# Patient Record
(demographics unavailable — no encounter records)

---

## 2024-10-09 NOTE — REASON FOR VISIT
[FreeTextEntry1] : Referred by her friend Kelly Espitia.  86 year-old female with left subclavian artery stenosis with subclavian steal syndrome, HTN, HLD, hypothyroidism, presents for followup.    Patient was last seen on 9/9/24 - Patient reports recent cold. She had echocardiogram done on 7/9/24 and nuclear stress test on 7/25/24 which were okay. I advised patient to start on ASA 81 mg.    Carotid Doppler 7/9/24 showed retrograde flow of the left vertebral artery.    Chest CTA at [facility] 8/3/24 showed complete occlusion of the proximal left subclavian artery.    Patient wore a 7-day event monitor 8/19/24 and it showed average HR of 60, rare APC's, rare PVC's, and 6 short runs of PAT (20 beats the longest). No pauses.

## 2024-10-09 NOTE — HISTORY OF PRESENT ILLNESS
[FreeTextEntry1] : 10/9/24 -   9/9/24 - Patient reports recent cold. She had echocardiogram done on 7/9/24 and nuclear stress test on 7/25/24 which were okay. I advised patient to start on ASA 81 mg.    8/19/24 - Pt reports 2 episodes of pre-syncope, one is last year, another one is on 6/12/24, each time lasting for a few minutes. Pt reports felt sudden SSCP and abdominal discomfort, lasting for a few seconds before the pre-syncope episode. Pt denies SOB or palpitation. Pt denies h/o syncope.  Pt had workup with Dr. NATE Jay.  Carotid Doppler showed retrograde flow of the left vertebral artery.  [place] [facility] [place] of the proximal left subclavian artery.  Pt drinks coffee 1 cup/day.  Pt is on Losartan 25mg, Lipitor 10mg, Levothyroxine 50mcg.  Today's /64 P 63.  Right brachial BP -120 left brachial BP -80.  Patient was not orthostatic (-150-140).  Exam unremarkable.  ECG showed no ischemic changes.  I advised patient to undergo an echocardiogram.  I advised patient to wear a 7-day event monitor.  I advised patient to consider intervention of the left subclavian artery stenosis.  Patient wore a 7-day event monitor and it showed average HR of 60, rare APC's, rare PVC's, and 6 short runs of PAT (20 beats the longest). No pauses.

## 2024-11-08 NOTE — REASON FOR VISIT
[FreeTextEntry1] : History of present illness 86-year-old female  Left subclavian artery stenosis with subclavian steal syndrome --Carotid Doppler on 7/9/2024 demonstrated retrograde flow of the left vertebral artery --Chest CTA at [facility] [place] on 8/3/2024 demonstrated complete occlusion of the proximal left subclavian artery --Complete occlusion of the proximal left subclavian artery with distal reconstitution likely via vascular collateralization.  This is not significantly changed compared to her prior study on 1/29/2021. Hypertension Hyperlipidemia  Hypothyroidism  She notes that she has suffered for year from discomfort in her right and left shoulder and left ankle.  Notes some mild pain in the third digit in her left hand.  Denies feeling light-headed or dizzy.  She walks long distance without any limitations. Notes walking 1000s of steps a day. No ataxia reported.  Does not use a cane or walker.  Notes mild reproducible discomfort in right and left anterior chest. The patient is right dominant. Notes no discomfort when carrying out activities in the left arm such as carrying objects.  Notes that the left side feels weak but that is chronic.  Notes sometimes has paresthesia in left arm.  Also notes increased abdominal gaseous distention.  Denies shortness of breath, lighted sensation, dizzy or palpitation.  No syncope reported.  She drinks 1 cup of coffee a day.    Shine/Avni (interpretory) [id number]  Assessment/plan Complete chronic obstruction of left subclavian artery Syncope ?etiology  --Discussed with patient findings on imaging studies.  She has had longstanding complete obstruction of the left subclavian artery.  It is not clear that the patient is having symptoms of vertebrobasilar insufficiency.  Does not report any changes in symptoms upon activity when she does exercise of the arm or externally does sharp turns of her head to the left. --She denies arm pain/fatigue/numbness or paresthesias.  Denies any recurrent dizziness, blurring of vision, syncope, vertigo, disequilibrium, ataxia, tinnitus or hearing loss.  It is noted that there is a difference of at least 50 mm between the affected and the normal arm.  Decrease radial pulse in the left compared to the right radial artery. --Due to the patient's mild nondisabling symptoms recommend continued medical management.  Continue aggressive medical management of blood pressure, dyslipidemia and other risk factors such as diabetes. --Indications and details for percutaneous intervention were reviewed.  Benefits and risks were discussed.  Risks include but are not limited to infection, bleeding, arrhythmia, TIA/stroke, hemodynamic instability, vascular injury, need for urgent surgery and death. --If the patient is to become symptomatic it is recommended that she undergo endovascular intervention.  She was instructed that due to the chronicity of the occlusion sometimes an endovascular approach cannot recanalize the vessel.  In such situations an oen surgical bypass may be considered.  Indications for endovascular intervention and the pros/cons and risks/benefits of the procedure were gone over.. -- EKG performed due to history of PFO.  All questions and concerns to the patient and family who accompanied her were addressed.

## 2024-11-08 NOTE — PHYSICAL EXAM
[Well Developed] : well developed [Well Nourished] : well nourished [No Acute Distress] : no acute distress [Normal Conjunctiva] : normal conjunctiva [Normal Venous Pressure] : normal venous pressure [No Carotid Bruit] : no carotid bruit [Normal S1, S2] : normal S1, S2 [No Murmur] : no murmur [No Rub] : no rub [No Gallop] : no gallop [Clear Lung Fields] : clear lung fields [Good Air Entry] : good air entry [No Respiratory Distress] : no respiratory distress  [Soft] : abdomen soft [Non Tender] : non-tender [No Masses/organomegaly] : no masses/organomegaly [Normal Bowel Sounds] : normal bowel sounds [Normal Gait] : normal gait [No Edema] : no edema [No Cyanosis] : no cyanosis [No Clubbing] : no clubbing [No Varicosities] : no varicosities [No Rash] : no rash [No Skin Lesions] : no skin lesions [No Focal Deficits] : no focal deficits [Moves all extremities] : moves all extremities [Normal Speech] : normal speech [Alert and Oriented] : alert and oriented [Normal memory] : normal memory [de-identified] : Decreased 1+ left radial pulse, right radial pulse 2+

## 2024-11-08 NOTE — REVIEW OF SYSTEMS
[Headache] : headache [Feeling Fatigued] : feeling fatigued [Joint Pain] : joint pain [Joint Stiffness] : joint stiffness [Muscle Cramps] : muscle cramps [Myalgia] : myalgia [Tingling (Paresthesia)] : tingling [Weakness] : weakness [Negative] : Heme/Lymph [FreeTextEntry7] : See HPI [de-identified] : See HPI, syncope

## 2024-11-08 NOTE — REVIEW OF SYSTEMS
[Headache] : headache [Feeling Fatigued] : feeling fatigued [Joint Pain] : joint pain [Joint Stiffness] : joint stiffness [Muscle Cramps] : muscle cramps [Myalgia] : myalgia [Tingling (Paresthesia)] : tingling [Weakness] : weakness [Negative] : Heme/Lymph [FreeTextEntry7] : See HPI [de-identified] : See HPI, syncope

## 2024-11-08 NOTE — PHYSICAL EXAM
[Well Developed] : well developed [Well Nourished] : well nourished [No Acute Distress] : no acute distress [Normal Conjunctiva] : normal conjunctiva [Normal Venous Pressure] : normal venous pressure [No Carotid Bruit] : no carotid bruit [Normal S1, S2] : normal S1, S2 [No Murmur] : no murmur [No Rub] : no rub [No Gallop] : no gallop [Clear Lung Fields] : clear lung fields [Good Air Entry] : good air entry [No Respiratory Distress] : no respiratory distress  [Soft] : abdomen soft [Non Tender] : non-tender [No Masses/organomegaly] : no masses/organomegaly [Normal Bowel Sounds] : normal bowel sounds [Normal Gait] : normal gait [No Edema] : no edema [No Cyanosis] : no cyanosis [No Clubbing] : no clubbing [No Varicosities] : no varicosities [No Rash] : no rash [No Skin Lesions] : no skin lesions [Moves all extremities] : moves all extremities [No Focal Deficits] : no focal deficits [Normal Speech] : normal speech [Alert and Oriented] : alert and oriented [Normal memory] : normal memory [de-identified] : Decreased 1+ left radial pulse, right radial pulse 2+

## 2024-11-08 NOTE — REASON FOR VISIT
[FreeTextEntry1] : History of present illness 86-year-old female  Left subclavian artery stenosis with subclavian steal syndrome --Carotid Doppler on 7/9/2024 demonstrated retrograde flow of the left vertebral artery --[place] [facility] [place] demonstrated complete occlusion of the proximal left subclavian artery --Complete occlusion of the proximal left subclavian artery with distal reconstitution likely via vascular collateralization.  This is not significantly changed compared to her prior study on 1/29/2021. Hypertension Hyperlipidemia  Hypothyroidism  She notes that she has suffered for year from discomfort in her right and left shoulder and left ankle.  Notes some mild pain in the third digit in her left hand.  Denies feeling light-headed or dizzy.  She walks long distance without any limitations. Notes walking 1000s of steps a day. No ataxia reported.  Does not use a cane or walker.  Notes mild reproducible discomfort in right and left anterior chest. The patient is right dominant. Notes no discomfort when carrying out activities in the left arm such as carrying objects.  Notes that the left side feels weak but that is chronic.  Notes sometimes has paresthesia in left arm.  Also notes increased abdominal gaseous distention.  Denies shortness of breath, lighted sensation, dizzy or palpitation.  No syncope reported.  She drinks 1 cup of coffee a day.    Shine/Avni (interpretory) [id number]  Assessment/plan Complete chronic obstruction of left subclavian artery Syncope ?etiology  --Discussed with patient findings on imaging studies.  She has had longstanding complete obstruction of the left subclavian artery.  It is not clear that the patient is having symptoms of vertebrobasilar insufficiency.  Does not report any changes in symptoms upon activity when she does exercise of the arm or externally does sharp turns of her head to the left. --She denies arm pain/fatigue/numbness or paresthesias.  Denies any recurrent dizziness, blurring of vision, syncope, vertigo, disequilibrium, ataxia, tinnitus or hearing loss.  It is noted that there is a difference of at least 50 mm between the affected and the normal arm.  Decrease radial pulse in the left compared to the right radial artery. --Due to the patient's mild nondisabling symptoms recommend continued medical management.  Continue aggressive medical management of blood pressure, dyslipidemia and other risk factors such as diabetes. --Indications and details for percutaneous intervention were reviewed.  Benefits and risks were discussed.  Risks include but are not limited to infection, bleeding, arrhythmia, TIA/stroke, hemodynamic instability, vascular injury, need for urgent surgery and death. --If the patient is to become symptomatic it is recommended that she undergo endovascular intervention.  She was instructed that due to the chronicity of the occlusion sometimes an endovascular approach cannot recanalize the vessel.  In such situations an oen surgical bypass may be considered.  Indications for endovascular intervention and the pros/cons and risks/benefits of the procedure were gone over.. -- EKG performed due to history of PFO.  All questions and concerns to the patient and family who accompanied her were addressed.

## 2025-02-27 NOTE — HISTORY OF PRESENT ILLNESS
[FreeTextEntry1] : 2/27/25 - Patient reports fatigue.  Patient reports pain in different body parts.  Patient denies CP, SOB, or palpitations.  Patient denies additional near syncopal episodes other than the 2 episodes last year.  Pt is on Losartan 25mg, Lipitor 10mg, Levothyroxine 50mcg. She is on ASA.  /68 HR 73.  Exam unremarkable.  I advised patient to continue current medications.  I advised patient to try CoQ 10 for myalgia.  FU 6 months.    10/9/24 - Please refer to NP note below. Pt wants to know more about the bypass procedure for left subclavian artery stenosis. Pt reports multiple body pain (including headache, right shoulder, legs). Pt also reports increased gases in abdomen and had vomited once in the morning. Patient denies CP, SOB, palpitations, or lightheadedness. Patient denies h/o syncope. Pt is on Losartan 25mg, Lipitor 10mg, Levothyroxine 50mcg. She is not on ASA.  Today's /66 P 80.  I advised patient to see Dr. David Solomon for possible subclavian artery stent.  Pt saw Dr. Solomon and pt likely not symptomatic enough to need stent yet.  9/9/24 - Patient reports recent cold. She had echocardiogram done on 7/9/24 and nuclear stress test on 7/25/24 which were okay. I advised patient to start on ASA 81 mg.    8/19/24 - Pt reports 2 episodes of pre-syncope, one is last year, another one is on 6/12/24, each time lasting for a few minutes. Pt reports felt sudden SSCP and abdominal discomfort, lasting for a few seconds before the pre-syncope episode. Pt denies SOB or palpitation. Pt denies h/o syncope.  Pt had workup with Dr. NATE Jay.  Carotid Doppler showed retrograde flow of the left vertebral artery.  Chest [place]A at [facility] [place] showed complete occlusion of the proximal left subclavian artery.  Pt drinks coffee 1 cup/day.  Pt is on Losartan 25mg, Lipitor 10mg, Levothyroxine 50mcg.  Today's /64 P 63.  Right brachial BP -120 left brachial BP -80.  Patient was not orthostatic (-150-140).  Exam unremarkable.  ECG showed no ischemic changes.  I advised patient to undergo an echocardiogram.  Echo with Dr. ALEX Jay on 7/9/24 showed normal LV function with mild MR and mild TR.  I advised patient to wear a 7-day event monitor.  I advised patient to consider intervention of the left subclavian artery stenosis.  Patient wore a 7-day event monitor and it showed average HR of 60, rare APC's, rare PVC's, and 6 short runs of PAT (20 beats the longest). No pauses.

## 2025-02-27 NOTE — REASON FOR VISIT
[FreeTextEntry1] : Referred by her friend Kelly Espitia.  87 year-old female with left subclavian artery stenosis with subclavian steal syndrome, HTN, HLD, hypothyroidism, presents for followup.    Patient was last seen on 10/9/24 -> Pt wants to know more about the bypass procedure for left subclavian artery stenosis. Pt reports multiple body pain (including headache, right shoulder, legs). Pt also reports increased gases in abdomen and had vomited once in the morning. Patient denies CP, SOB, palpitations, or lightheadedness. Patient denies h/o syncope. Pt is on Losartan 25mg, Lipitor 10mg, Levothyroxine 50mcg. She is not on ASA.  Today's /66 P 80.  I advised patient to see Dr. David Solomon for possible subclavian artery stent.  Pt saw Dr. Solomon and pt likely not symptomatic enough to need stent yet.  Carotid Doppler 7/9/24 showed retrograde flow of the left vertebral artery.    Chest CTA at [facility] 8/3/24 showed complete occlusion of the proximal left subclavian artery.    Patient wore a 7-day event monitor 8/19/24 and it showed average HR of 60, rare APC's, rare PVC's, and 6 short runs of PAT (20 beats the longest). No pauses.    Echo with Dr. ALEX Jay on 7/9/24 showed normal LV function with mild MR and mild TR.

## 2025-03-09 NOTE — PHYSICAL EXAM
[Well Developed] : well developed [Well Nourished] : well nourished [No Acute Distress] : no acute distress [Normal Conjunctiva] : normal conjunctiva [Normal Venous Pressure] : normal venous pressure [No Carotid Bruit] : no carotid bruit [Normal S1, S2] : normal S1, S2 [No Murmur] : no murmur [No Rub] : no rub [No Gallop] : no gallop [Clear Lung Fields] : clear lung fields [Good Air Entry] : good air entry [No Respiratory Distress] : no respiratory distress  [Soft] : abdomen soft [Non Tender] : non-tender [No Masses/organomegaly] : no masses/organomegaly [Normal Bowel Sounds] : normal bowel sounds [Normal Gait] : normal gait [No Edema] : no edema [No Cyanosis] : no cyanosis [No Clubbing] : no clubbing [No Varicosities] : no varicosities [No Rash] : no rash [No Skin Lesions] : no skin lesions [Moves all extremities] : moves all extremities [No Focal Deficits] : no focal deficits [Normal Speech] : normal speech [Alert and Oriented] : alert and oriented [Normal memory] : normal memory [de-identified] : Decreased 1+ left radial pulse, right radial pulse 2+

## 2025-03-09 NOTE — REVIEW OF SYSTEMS
[Feeling Fatigued] : feeling fatigued [Joint Pain] : joint pain [Joint Stiffness] : joint stiffness [Muscle Cramps] : muscle cramps [Myalgia] : myalgia [Weakness] : weakness [Negative] : Heme/Lymph [Headache] : no headache [Tingling (Paresthesia)] : no tingling [FreeTextEntry7] : See HPI [FreeTextEntry8] : See HPI [de-identified] : See HPI, syncope

## 2025-03-09 NOTE — REASON FOR VISIT
[FreeTextEntry1] : History of present illness 87-year-old female  Left subclavian artery stenosis with subclavian steal syndrome --Carotid Doppler on 7/9/2024 demonstrated retrograde flow of the left vertebral artery --[place] [facility] [place] demonstrated complete occlusion of the proximal left subclavian artery --Complete occlusion of the proximal left subclavian artery with distal reconstitution likely via vascular collateralization.  This is not significantly changed compared to her prior study on 1/29/2021. Hypertension Hyperlipidemia  Hypothyroidism  She notes that she has suffered for year from discomfort in her right and left shoulder and left ankle.  Notes some mild pain in the third digit in her left hand.  Denies feeling light-headed or dizzy.  She walks long distance without any limitations. Notes walking 1000s of steps a day. No ataxia reported.  Does not use a cane or walker.  Notes mild reproducible discomfort in right and left anterior chest. The patient is right dominant. Notes no discomfort when carrying out activities in the left arm such as carrying objects.  Notes that the left side feels weak but that is chronic.  Notes sometimes has paresthesia in left arm.  Also notes increased abdominal gaseous distention.  Denies shortness of breath, lighted sensation, dizzy or palpitation.  No syncope reported.  She drinks 1 cup of coffee a day.    Shine/Avni (interpretory) [id number]  ================================= 3/6/2025 Suffers from chronic fatigue and pain in different body parts (unchanged). Denies any cardiopulmonary complaints or palpitations. No recent presyncope or near syncopal episodes other than the 2 episodes last year. It was recommended she start CoQ 10 for myalgia.  Walks every day around [address] steps if weather is nice.  Denies any change in exercise tolerance. Notes that her urine "stinks" and feels that something is wrong with her kidney.  Her urine began to smell two months ago.  Her urine is normal in color.  No pain in her right arm.  No fevers, chills or sweats.  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  Seeing her internist tomorrow in [place] during which time blood work/urinalysis. Notes she suffers from urinary incontinence.  If abnormal renal insuffiency recommend patient be seen by a nephrologist. --Discussed with patient and her friend who accompanied her findings on imaging studies.  She has had longstanding complete obstruction of the left subclavian artery.  It is not clear that the patient is having symptoms of vertebrobasilar insufficiency.  Does not report any changes in symptoms upon activity when she does exercise of the arm or externally or does sharp turns of her head to the left. --She denies arm pain/fatigue/numbness or paresthesias.  Denies any recurrent dizziness, blurring of vision, syncope, vertigo, disequilibrium, ataxia, tinnitus or hearing loss.  It is noted that there is a difference of at least 50 mm between the affected and the normal arm.  Decrease radial pulse in the left compared to the right radial artery. --Due to the patient's mild nondisabling symptoms recommend continued medical management.  Continue aggressive medical management of blood pressure, dyslipidemia and other risk factors such as diabetes. --Indications and details for percutaneous intervention were reviewed.  Benefits and risks were discussed.  Risks include but are not limited to infection, bleeding, arrhythmia, TIA/stroke, hemodynamic instability, vascular injury, need for urgent surgery and death. --If the patient is to become symptomatic it is recommended that she undergo endovascular intervention.  She was instructed that due to the chronicity of the occlusion sometimes an endovascular approach cannot recanalize the vessel.  In such situations an open surgical bypass may be considered.  Indications for endovascular intervention and the pros/cons and risks/benefits of the procedure were gone over.. -- EKG performed due to history of PFO.  All questions and concerns to the patient and friend who accompanied her were addressed.

## 2025-03-09 NOTE — REVIEW OF SYSTEMS
[Feeling Fatigued] : feeling fatigued [Joint Pain] : joint pain [Joint Stiffness] : joint stiffness [Muscle Cramps] : muscle cramps [Myalgia] : myalgia [Weakness] : weakness [Negative] : Heme/Lymph [Headache] : no headache [Tingling (Paresthesia)] : no tingling [FreeTextEntry7] : See HPI [FreeTextEntry8] : See HPI [de-identified] : See HPI, syncope

## 2025-03-09 NOTE — REASON FOR VISIT
[FreeTextEntry1] : History of present illness 87-year-old female  Left subclavian artery stenosis with subclavian steal syndrome --Carotid Doppler on 7/9/2024 demonstrated retrograde flow of the left vertebral artery --[place] at [facility] [place]3/2024 demonstrated complete occlusion of the proximal left subclavian artery --Complete occlusion of the proximal left subclavian artery with distal reconstitution likely via vascular collateralization.  This is not significantly changed compared to her prior study on 1/29/2021. Hypertension Hyperlipidemia  Hypothyroidism  She notes that she has suffered for year from discomfort in her right and left shoulder and left ankle.  Notes some mild pain in the third digit in her left hand.  Denies feeling light-headed or dizzy.  She walks long distance without any limitations. Notes walking 1000s of steps a day. No ataxia reported.  Does not use a cane or walker.  Notes mild reproducible discomfort in right and left anterior chest. The patient is right dominant. Notes no discomfort when carrying out activities in the left arm such as carrying objects.  Notes that the left side feels weak but that is chronic.  Notes sometimes has paresthesia in left arm.  Also notes increased abdominal gaseous distention.  Denies shortness of breath, lighted sensation, dizzy or palpitation.  No syncope reported.  She drinks 1 cup of coffee a day.    Shine/Avni (interpretory) [id number]  ================================= 3/6/2025 Suffers from chronic fatigue and pain in different body parts (unchanged). Denies any cardiopulmonary complaints or palpitations. No recent presyncope or near syncopal episodes other than the 2 episodes last year. It was recommended she start CoQ 10 for myalgia.  Walks every day around 93310 steps if weather is nice.  Denies any change in exercise tolerance. Notes that her urine "stinks" and feels that something is wrong with her kidney.  Her urine began to smell two months ago.  Her urine is normal in color.  No pain in her right arm.  No fevers, chills or sweats.  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  Seeing her internist tomorrow in [place] during which time blood work/urinalysis. Notes she suffers from urinary incontinence.  If abnormal renal insuffiency recommend patient be seen by a nephrologist. --Discussed with patient and her friend who accompanied her findings on imaging studies.  She has had longstanding complete obstruction of the left subclavian artery.  It is not clear that the patient is having symptoms of vertebrobasilar insufficiency.  Does not report any changes in symptoms upon activity when she does exercise of the arm or externally or does sharp turns of her head to the left. --She denies arm pain/fatigue/numbness or paresthesias.  Denies any recurrent dizziness, blurring of vision, syncope, vertigo, disequilibrium, ataxia, tinnitus or hearing loss.  It is noted that there is a difference of at least 50 mm between the affected and the normal arm.  Decrease radial pulse in the left compared to the right radial artery. --Due to the patient's mild nondisabling symptoms recommend continued medical management.  Continue aggressive medical management of blood pressure, dyslipidemia and other risk factors such as diabetes. --Indications and details for percutaneous intervention were reviewed.  Benefits and risks were discussed.  Risks include but are not limited to infection, bleeding, arrhythmia, TIA/stroke, hemodynamic instability, vascular injury, need for urgent surgery and death. --If the patient is to become symptomatic it is recommended that she undergo endovascular intervention.  She was instructed that due to the chronicity of the occlusion sometimes an endovascular approach cannot recanalize the vessel.  In such situations an open surgical bypass may be considered.  Indications for endovascular intervention and the pros/cons and risks/benefits of the procedure were gone over.. -- EKG performed due to history of PFO.  All questions and concerns to the patient and friend who accompanied her were addressed.

## 2025-03-09 NOTE — PHYSICAL EXAM
[Well Developed] : well developed [Well Nourished] : well nourished [No Acute Distress] : no acute distress [Normal Conjunctiva] : normal conjunctiva [Normal Venous Pressure] : normal venous pressure [No Carotid Bruit] : no carotid bruit [Normal S1, S2] : normal S1, S2 [No Murmur] : no murmur [No Rub] : no rub [No Gallop] : no gallop [Clear Lung Fields] : clear lung fields [Good Air Entry] : good air entry [No Respiratory Distress] : no respiratory distress  [Soft] : abdomen soft [Non Tender] : non-tender [No Masses/organomegaly] : no masses/organomegaly [Normal Bowel Sounds] : normal bowel sounds [Normal Gait] : normal gait [No Edema] : no edema [No Cyanosis] : no cyanosis [No Clubbing] : no clubbing [No Varicosities] : no varicosities [No Rash] : no rash [No Skin Lesions] : no skin lesions [Moves all extremities] : moves all extremities [No Focal Deficits] : no focal deficits [Normal Speech] : normal speech [Alert and Oriented] : alert and oriented [Normal memory] : normal memory [de-identified] : Decreased 1+ left radial pulse, right radial pulse 2+

## 2025-05-23 NOTE — HISTORY OF PRESENT ILLNESS
[FreeTextEntry1] : 5/19/25 - Patient is planning on going on a trip but is concerned and anxious about ear pain and leg pain on the plane. Patient denies CP. Patient denies SOB. Patient denies palpitations. Patient denies lightheadedness. Patient is on Losartan 25 mg.   (1) Left subclavian artery stenosis with subclavian steal syndrome - Patient has been stable.   (2) HTN - Her BP was elevated.  I advised patient to increase Losartan 50 mg.  I advised patient to take Amlodipine 2.5 mg PRN SBP>150.  (3) Insomnia, muscle pain - I advised patient to try MgOxide qhs.  (3) followup - 3 months.  2/27/25 - Patient reports fatigue.  Patient reports pain in different body parts.  Patient denies CP, SOB, or palpitations.  Patient denies additional near syncopal episodes other than the 2 episodes last year.  Pt is on Losartan 25mg, Lipitor 10mg, Levothyroxine 50mcg. She is on ASA.  /68 HR 73.  Exam unremarkable.  I advised patient to continue current medications.  I advised patient to try CoQ 10 for myalgia.  FU 6 months.    10/9/24 - Please refer to NP note below. Pt wants to know more about the bypass procedure for left subclavian artery stenosis. Pt reports multiple body pain (including headache, right shoulder, legs). Pt also reports increased gases in abdomen and had vomited once in the morning. Patient denies CP, SOB, palpitations, or lightheadedness. Patient denies h/o syncope. Pt is on Losartan 25mg, Lipitor 10mg, Levothyroxine 50mcg. She is not on ASA.  Today's /66 P 80.  I advised patient to see Dr. David Solomon for possible subclavian artery stent.  Pt saw Dr. Solomon and pt likely not symptomatic enough to need stent yet.  9/9/24 - Patient reports recent cold. She had echocardiogram done on 7/9/24 and nuclear stress test on 7/25/24 which were okay. I advised patient to start on ASA 81 mg.    8/19/24 - Pt reports 2 episodes of pre-syncope, one is last year, another one is on 6/12/24, each time lasting for a few minutes. Pt reports felt sudden SSCP and abdominal discomfort, lasting for a few seconds before the pre-syncope episode. Pt denies SOB or palpitation. Pt denies h/o syncope.  Pt had workup with Dr. NATE Jay.  Carotid Doppler showed retrograde flow of the left vertebral artery.  Chest CTA at [facility] [place] showed complete occlusion of the proximal left subclavian artery.  Pt drinks coffee 1 cup/day.  Pt is on Losartan 25mg, Lipitor 10mg, Levothyroxine 50mcg.  Today's /64 P 63.  Right brachial BP -120 left brachial BP -80.  Patient was not orthostatic (-150-140).  Exam unremarkable.  ECG showed no ischemic changes.  I advised patient to undergo an echocardiogram.  Echo with Dr. ALEX Jay on 7/9/24 showed normal LV function with mild MR and mild TR.  I advised patient to wear a 7-day event monitor.  I advised patient to consider intervention of the left subclavian artery stenosis.  Patient wore a 7-day event monitor and it showed average HR of 60, rare APC's, rare PVC's, and 6 short runs of PAT (20 beats the longest). No pauses.

## 2025-05-23 NOTE — REASON FOR VISIT
[FreeTextEntry1] : Referred by her friend Kelly Espitia.  87 year-old female with left subclavian artery stenosis with subclavian steal syndrome, HTN, HLD, hypothyroidism, presents for followup.    Patient was last seen on 2/27/25 -> Patient reports fatigue.  Patient reports pain in different body parts.  Patient denies CP, SOB, or palpitations.  Patient denies additional near syncopal episodes other than the 2 episodes last year.  Pt is on Losartan 25mg, Lipitor 10mg, Levothyroxine 50mcg. She is on ASA.  /68 HR 73.  Exam unremarkable.  I advised patient to continue current medications.  I advised patient to try CoQ 10 for myalgia.  FU 6 months.    Carotid Doppler 7/9/24 showed retrograde flow of the left vertebral artery.    Chest CTA at [facility] 8/3/24 showed complete occlusion of the proximal left subclavian artery.    Patient wore a 7-day event monitor 8/19/24 and it showed average HR of 60, rare APC's, rare PVC's, and 6 short runs of PAT (20 beats the longest). No pauses.    Echo with Dr. ALEX Jay on 7/9/24 showed normal LV function with mild MR and mild TR.

## 2025-05-23 NOTE — HISTORY OF PRESENT ILLNESS
[FreeTextEntry1] : 5/19/25 - Patient is planning on going on a trip but is concerned and anxious about ear pain and leg pain on the plane. Patient denies CP. Patient denies SOB. Patient denies palpitations. Patient denies lightheadedness. Patient is on Losartan 25 mg.   (1) Left subclavian artery stenosis with subclavian steal syndrome - Patient has been stable.   (2) HTN - Her BP was elevated.  I advised patient to increase Losartan 50 mg.  I advised patient to take Amlodipine 2.5 mg PRN SBP>150.  (3) Insomnia, muscle pain - I advised patient to try MgOxide qhs.  (3) followup - 3 months.  2/27/25 - Patient reports fatigue.  Patient reports pain in different body parts.  Patient denies CP, SOB, or palpitations.  Patient denies additional near syncopal episodes other than the 2 episodes last year.  Pt is on Losartan 25mg, Lipitor 10mg, Levothyroxine 50mcg. She is on ASA.  /68 HR 73.  Exam unremarkable.  I advised patient to continue current medications.  I advised patient to try CoQ 10 for myalgia.  FU 6 months.    10/9/24 - Please refer to NP note below. Pt wants to know more about the bypass procedure for left subclavian artery stenosis. Pt reports multiple body pain (including headache, right shoulder, legs). Pt also reports increased gases in abdomen and had vomited once in the morning. Patient denies CP, SOB, palpitations, or lightheadedness. Patient denies h/o syncope. Pt is on Losartan 25mg, Lipitor 10mg, Levothyroxine 50mcg. She is not on ASA.  Today's /66 P 80.  I advised patient to see Dr. David Solomon for possible subclavian artery stent.  Pt saw Dr. Solomon and pt likely not symptomatic enough to need stent yet.  9/9/24 - Patient reports recent cold. She had echocardiogram done on 7/9/24 and nuclear stress test on 7/25/24 which were okay. I advised patient to start on ASA 81 mg.    8/19/24 - Pt reports 2 episodes of pre-syncope, one is last year, another one is on 6/12/24, each time lasting for a few minutes. Pt reports felt sudden SSCP and abdominal discomfort, lasting for a few seconds before the pre-syncope episode. Pt denies SOB or palpitation. Pt denies h/o syncope.  Pt had workup with Dr. NATE Jay.  Carotid Doppler showed retrograde flow of the left vertebral artery.  [place]A at [facility] [place] showed complete occlusion of the proximal left subclavian artery.  Pt drinks coffee 1 cup/day.  Pt is on Losartan 25mg, Lipitor 10mg, Levothyroxine 50mcg.  Today's /64 P 63.  Right brachial BP -120 left brachial BP -80.  Patient was not orthostatic (-150-140).  Exam unremarkable.  ECG showed no ischemic changes.  I advised patient to undergo an echocardiogram.  Echo with Dr. ALEX Jay on 7/9/24 showed normal LV function with mild MR and mild TR.  I advised patient to wear a 7-day event monitor.  I advised patient to consider intervention of the left subclavian artery stenosis.  Patient wore a 7-day event monitor and it showed average HR of 60, rare APC's, rare PVC's, and 6 short runs of PAT (20 beats the longest). No pauses.

## 2025-05-23 NOTE — HISTORY OF PRESENT ILLNESS
[FreeTextEntry1] : 5/19/25 - Patient is planning on going on a trip but is concerned and anxious about ear pain and leg pain on the plane. Patient denies CP. Patient denies SOB. Patient denies palpitations. Patient denies lightheadedness. Patient is on Losartan 25 mg.   (1) Left subclavian artery stenosis with subclavian steal syndrome - Patient has been stable.   (2) HTN - Her BP was elevated.  I advised patient to increase Losartan 50 mg.  I advised patient to take Amlodipine 2.5 mg PRN SBP>150.  (3) Insomnia, muscle pain - I advised patient to try MgOxide qhs.  (3) followup - 3 months.  2/27/25 - Patient reports fatigue.  Patient reports pain in different body parts.  Patient denies CP, SOB, or palpitations.  Patient denies additional near syncopal episodes other than the 2 episodes last year.  Pt is on Losartan 25mg, Lipitor 10mg, Levothyroxine 50mcg. She is on ASA.  /68 HR 73.  Exam unremarkable.  I advised patient to continue current medications.  I advised patient to try CoQ 10 for myalgia.  FU 6 months.    10/9/24 - Please refer to NP note below. Pt wants to know more about the bypass procedure for left subclavian artery stenosis. Pt reports multiple body pain (including headache, right shoulder, legs). Pt also reports increased gases in abdomen and had vomited once in the morning. Patient denies CP, SOB, palpitations, or lightheadedness. Patient denies h/o syncope. Pt is on Losartan 25mg, Lipitor 10mg, Levothyroxine 50mcg. She is not on ASA.  Today's /66 P 80.  I advised patient to see Dr. David Solomon for possible subclavian artery stent.  Pt saw Dr. Solomon and pt likely not symptomatic enough to need stent yet.  9/9/24 - Patient reports recent cold. She had echocardiogram done on 7/9/24 and nuclear stress test on 7/25/24 which were okay. I advised patient to start on ASA 81 mg.    8/19/24 - Pt reports 2 episodes of pre-syncope, one is last year, another one is on 6/12/24, each time lasting for a few minutes. Pt reports felt sudden SSCP and abdominal discomfort, lasting for a few seconds before the pre-syncope episode. Pt denies SOB or palpitation. Pt denies h/o syncope.  Pt had workup with Dr. NATE Jay.  Carotid Doppler showed retrograde flow of the left vertebral artery.  Chest CTA at [facility] [place] showed complete occlusion of the proximal left subclavian artery.  Pt drinks coffee 1 cup/day.  Pt is on Losartan 25mg, Lipitor 10mg, Levothyroxine 50mcg.  Today's /64 P 63.  Right brachial BP -120 left brachial BP -80.  Patient was not orthostatic (-150-140).  Exam unremarkable.  ECG showed no ischemic changes.  I advised patient to undergo an echocardiogram.  Echo with Dr. ALEX Jay on 7/9/24 showed normal LV function with mild MR and mild TR.  I advised patient to wear a 7-day event monitor.  I advised patient to consider intervention of the left subclavian artery stenosis.  Patient wore a 7-day event monitor and it showed average HR of 60, rare APC's, rare PVC's, and 6 short runs of PAT (20 beats the longest). No pauses.